# Patient Record
Sex: MALE | Race: WHITE | URBAN - METROPOLITAN AREA
[De-identification: names, ages, dates, MRNs, and addresses within clinical notes are randomized per-mention and may not be internally consistent; named-entity substitution may affect disease eponyms.]

---

## 2022-06-30 ENCOUNTER — OFFICE VISIT (OUTPATIENT)
Dept: URBAN - METROPOLITAN AREA CLINIC 27 | Facility: CLINIC | Age: 87
End: 2022-06-30
Payer: MEDICARE

## 2022-06-30 DIAGNOSIS — H27.01 APHAKIA, RIGHT EYE: ICD-10-CM

## 2022-06-30 DIAGNOSIS — Z96.1 PRESENCE OF PSEUDOPHAKIA: ICD-10-CM

## 2022-06-30 DIAGNOSIS — H59.021: Primary | ICD-10-CM

## 2022-06-30 PROCEDURE — 99204 OFFICE O/P NEW MOD 45 MIN: CPT | Performed by: OPHTHALMOLOGY

## 2022-06-30 ASSESSMENT — INTRAOCULAR PRESSURE
OS: 13
OD: 19

## 2022-06-30 NOTE — IMPRESSION/PLAN
Impression: Cataract fragments in eye following cataract surgery of right eye: H59.021.
s/p CE 4 days ago Plan: --exam confirms retained lens material s/p CE/IOL
--findings/diagnosis d/w pt in detail  
--surgical intervention is recommended --r/b/a PPV/PPL/IOL reviewed, inc bleeding, pain, infection, vision loss --pt elects to proceed with surgery SURGICAL PLAN: 25G PPV/PPL/sulcus vs ACIOL OD Need IOL calcs for MA60 and B&L [id number]

## 2022-06-30 NOTE — IMPRESSION/PLAN
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  Plan: --proceed with secondary IOL placement at time of surgery (see above)

## 2022-07-06 ENCOUNTER — POST-OPERATIVE VISIT (OUTPATIENT)
Dept: URBAN - METROPOLITAN AREA CLINIC 41 | Facility: CLINIC | Age: 87
End: 2022-07-06
Payer: MEDICARE

## 2022-07-06 DIAGNOSIS — H59.021: Primary | ICD-10-CM

## 2022-07-06 PROCEDURE — 99024 POSTOP FOLLOW-UP VISIT: CPT | Performed by: OPHTHALMOLOGY

## 2022-07-06 NOTE — IMPRESSION/PLAN
Impression: S/P 25G PPV/PPL/sulcus vs ACIOL OD - 1 Day. Cataract fragments in eye following cataract surgery of right eye  H59.021.  Plan: --retina attached
--no s/s infection
--ACIOL stable
--IOP acceptable
--start PF/Oflox QID
--RD/endoph WS discussed
--f/u 1 week PO OD

## 2022-07-18 ENCOUNTER — POST-OPERATIVE VISIT (OUTPATIENT)
Dept: URBAN - METROPOLITAN AREA CLINIC 41 | Facility: CLINIC | Age: 87
End: 2022-07-18
Payer: MEDICARE

## 2022-07-18 DIAGNOSIS — H59.021: Primary | ICD-10-CM

## 2022-07-18 PROCEDURE — 99024 POSTOP FOLLOW-UP VISIT: CPT | Performed by: OPHTHALMOLOGY

## 2022-07-18 ASSESSMENT — INTRAOCULAR PRESSURE
OD: 21
OS: 18

## 2022-07-18 NOTE — IMPRESSION/PLAN
Impression: S/P 25G PPV/PPL/sulcus vs ACIOL OD - 13 Days. Cataract fragments in eye following cataract surgery of right eye  H59.021. Plan: --Excellent post op course   
--Condition is improving 
--Cont PF QID
--Discontinue Ofloxacin 0.3% RTC: 2 wks POS OD with OCT OU

## 2022-08-04 ENCOUNTER — POST-OPERATIVE VISIT (OUTPATIENT)
Dept: URBAN - METROPOLITAN AREA CLINIC 41 | Facility: CLINIC | Age: 87
End: 2022-08-04
Payer: MEDICARE

## 2022-08-04 DIAGNOSIS — H59.021: Primary | ICD-10-CM

## 2022-08-04 PROCEDURE — 99024 POSTOP FOLLOW-UP VISIT: CPT | Performed by: OPHTHALMOLOGY

## 2022-08-04 PROCEDURE — 92134 CPTRZ OPH DX IMG PST SGM RTA: CPT | Performed by: OPHTHALMOLOGY

## 2022-08-04 ASSESSMENT — INTRAOCULAR PRESSURE
OD: 17
OS: 15

## 2022-08-04 NOTE — IMPRESSION/PLAN
Impression: S/P 25G PPV/PPL/sulcus vs ACIOL OD - 30 Days. Cataract fragments in eye following cataract surgery of right eye  H59.021. Plan: --retina attached
--no s/s infection
--IOP acceptable
--taper PF as directed
--OCT shows good macular contour
--RD/endoph WS discussed RTC 4-6 weeks PO/OCT OD

## 2022-09-15 ENCOUNTER — POST-OPERATIVE VISIT (OUTPATIENT)
Dept: URBAN - METROPOLITAN AREA CLINIC 41 | Facility: CLINIC | Age: 87
End: 2022-09-15
Payer: MEDICARE

## 2022-09-15 DIAGNOSIS — H59.021: Primary | ICD-10-CM

## 2022-09-15 PROCEDURE — 99024 POSTOP FOLLOW-UP VISIT: CPT | Performed by: OPHTHALMOLOGY

## 2022-09-15 PROCEDURE — 92134 CPTRZ OPH DX IMG PST SGM RTA: CPT | Performed by: OPHTHALMOLOGY

## 2022-09-15 ASSESSMENT — INTRAOCULAR PRESSURE
OD: 15
OS: 14

## 2022-09-15 NOTE — IMPRESSION/PLAN
Impression: S/P 25G PPV/PPL/sulcus vs ACIOL OD - 72 Days. Cataract fragments in eye following cataract surgery of right eye  H59.021. Plan: --retina attached
--no s/s infection
--IOP acceptable
--OCT shows good macular contour --persistent corneal edema
--return to Dr. Rodriguez Left RTC PRN